# Patient Record
Sex: MALE | Race: WHITE | NOT HISPANIC OR LATINO | Employment: OTHER | URBAN - METROPOLITAN AREA
[De-identification: names, ages, dates, MRNs, and addresses within clinical notes are randomized per-mention and may not be internally consistent; named-entity substitution may affect disease eponyms.]

---

## 2019-09-24 ENCOUNTER — HOSPITAL ENCOUNTER (EMERGENCY)
Facility: HOSPITAL | Age: 34
Discharge: HOME | End: 2019-09-24
Attending: EMERGENCY MEDICINE

## 2019-09-24 VITALS
HEART RATE: 100 BPM | TEMPERATURE: 98 F | DIASTOLIC BLOOD PRESSURE: 92 MMHG | HEIGHT: 71 IN | SYSTOLIC BLOOD PRESSURE: 168 MMHG | RESPIRATION RATE: 20 BRPM | WEIGHT: 200 LBS | OXYGEN SATURATION: 98 % | BODY MASS INDEX: 28 KG/M2

## 2019-09-24 DIAGNOSIS — N50.819 TESTICLE PAIN: Primary | ICD-10-CM

## 2019-09-24 DIAGNOSIS — N44.00 TORSION OF TESTIS: ICD-10-CM

## 2019-09-24 LAB
ALBUMIN SERPL BCP-MCNC: 5.5 G/DL (ref 3.5–5.2)
ALP SERPL-CCNC: 76 U/L (ref 55–135)
ALT SERPL W/O P-5'-P-CCNC: 15 U/L (ref 10–44)
ANION GAP SERPL CALC-SCNC: 10 MMOL/L (ref 8–16)
AST SERPL-CCNC: 16 U/L (ref 10–40)
BASOPHILS # BLD AUTO: 0.01 K/UL (ref 0–0.2)
BASOPHILS NFR BLD: 0.1 % (ref 0–1.9)
BILIRUB SERPL-MCNC: 1.4 MG/DL (ref 0.1–1)
BILIRUB UR QL STRIP: NEGATIVE
BUN SERPL-MCNC: 19 MG/DL (ref 6–20)
CALCIUM SERPL-MCNC: 10.5 MG/DL (ref 8.7–10.5)
CHLORIDE SERPL-SCNC: 100 MMOL/L (ref 95–110)
CLARITY UR: CLEAR
CO2 SERPL-SCNC: 25 MMOL/L (ref 23–29)
COLOR UR: YELLOW
CREAT SERPL-MCNC: 0.9 MG/DL (ref 0.5–1.4)
DIFFERENTIAL METHOD: ABNORMAL
EOSINOPHIL # BLD AUTO: 0 K/UL (ref 0–0.5)
EOSINOPHIL NFR BLD: 0.1 % (ref 0–8)
ERYTHROCYTE [DISTWIDTH] IN BLOOD BY AUTOMATED COUNT: 12.3 % (ref 11.5–14.5)
EST. GFR  (AFRICAN AMERICAN): >60 ML/MIN/1.73 M^2
EST. GFR  (NON AFRICAN AMERICAN): >60 ML/MIN/1.73 M^2
GLUCOSE SERPL-MCNC: 109 MG/DL (ref 70–110)
GLUCOSE UR QL STRIP: NEGATIVE
HCT VFR BLD AUTO: 45.9 % (ref 40–54)
HGB BLD-MCNC: 16.3 G/DL (ref 14–18)
HGB UR QL STRIP: NEGATIVE
KETONES UR QL STRIP: ABNORMAL
LEUKOCYTE ESTERASE UR QL STRIP: NEGATIVE
LYMPHOCYTES # BLD AUTO: 1 K/UL (ref 1–4.8)
LYMPHOCYTES NFR BLD: 11 % (ref 18–48)
MCH RBC QN AUTO: 31.3 PG (ref 27–31)
MCHC RBC AUTO-ENTMCNC: 35.5 G/DL (ref 32–36)
MCV RBC AUTO: 88 FL (ref 82–98)
MONOCYTES # BLD AUTO: 0.6 K/UL (ref 0.3–1)
MONOCYTES NFR BLD: 6.5 % (ref 4–15)
NEUTROPHILS # BLD AUTO: 7.4 K/UL (ref 1.8–7.7)
NEUTROPHILS NFR BLD: 82.3 % (ref 38–73)
NITRITE UR QL STRIP: NEGATIVE
PH UR STRIP: 6 [PH] (ref 5–8)
PLATELET # BLD AUTO: 229 K/UL (ref 150–350)
PMV BLD AUTO: 9.3 FL (ref 9.2–12.9)
POTASSIUM SERPL-SCNC: 4 MMOL/L (ref 3.5–5.1)
PROT SERPL-MCNC: 8.8 G/DL (ref 6–8.4)
PROT UR QL STRIP: NEGATIVE
RBC # BLD AUTO: 5.2 M/UL (ref 4.6–6.2)
SODIUM SERPL-SCNC: 135 MMOL/L (ref 136–145)
SP GR UR STRIP: <=1.005 (ref 1–1.03)
URN SPEC COLLECT METH UR: ABNORMAL
UROBILINOGEN UR STRIP-ACNC: NEGATIVE EU/DL
WBC # BLD AUTO: 9.08 K/UL (ref 3.9–12.7)

## 2019-09-24 PROCEDURE — 81003 URINALYSIS AUTO W/O SCOPE: CPT

## 2019-09-24 PROCEDURE — 80053 COMPREHEN METABOLIC PANEL: CPT

## 2019-09-24 PROCEDURE — 99284 EMERGENCY DEPT VISIT MOD MDM: CPT | Mod: 25

## 2019-09-24 PROCEDURE — 85025 COMPLETE CBC W/AUTO DIFF WBC: CPT

## 2019-09-24 NOTE — ED NOTES
"Pt lying in bed resting and states he is not in any pain.  Pt states, "the ultrasound looked good they said, so I guess I am just the crazy drug addict elsa.  Sorry I came here and bothered you for nothing".  Pt also mentioned that he hasn't slept in 3 days.   "

## 2019-09-24 NOTE — ED NOTES
Patient identifiers for Shaquille Pinto II checked and correct.  LOC: The patient is awake, alert and aware of environment, the patient is oriented x 3 and speaking appropriately.  APPEARANCE: Patient resting comfortably and in no acute distress.  SKIN: The skin is warm and dry, patient has normal skin turgor and moist mucus membranes, skin intact, no breakdown or brusing noted.  MUSKULOSKELETAL: Patient moving all extremities well, no obvious swelling or deformities noted.  RESPIRATORY: Airway is open and patent, respirations are spontaneous, patient has a normal effort and rate.

## 2019-09-24 NOTE — ED NOTES
Complaining of testicle pain.  States it feels like testicle is twisted. States it started around 2 to 3 hours ago.

## 2019-09-25 NOTE — ED PROVIDER NOTES
Encounter Date: 9/24/2019       History     Chief Complaint   Patient presents with    Testicle Pain     pt reports testicle pain that started 3 hours ago     This is a 34-year-old male who denies any medical history, who endorses methamphetamine abuse, presents the ER for evaluation of testicular pain.  Onset approximately 3 hr ago.  Patient reported he had consumed methamphetamines and was masturbating, and then experienced a sharp intense pain in his left testicle.  Reported similar experience happened approximately 3 years ago, resolved on its own.  No fever,, chest pain, shortness of breath, nausea.  He reportedly was concern for urgent came to the for further evaluation.        Review of patient's allergies indicates:  No Known Allergies  No past medical history on file.  No past surgical history on file.  No family history on file.  Social History     Tobacco Use    Smoking status: Not on file   Substance Use Topics    Alcohol use: Not on file    Drug use: Not on file     Review of Systems   Constitutional: Negative for diaphoresis, fatigue and fever.   Respiratory: Negative for chest tightness.    Cardiovascular: Negative for chest pain.   Gastrointestinal: Negative for abdominal distention and abdominal pain.   Genitourinary: Positive for testicular pain. Negative for difficulty urinating, dysuria and genital sores.   Skin: Negative for color change and pallor.   Psychiatric/Behavioral: Negative for agitation and behavioral problems.   All other systems reviewed and are negative.      Physical Exam     Initial Vitals [09/24/19 0520]   BP Pulse Resp Temp SpO2   (!) 176/104 (!) 115 20 97.6 °F (36.4 °C) 100 %      MAP       --         Physical Exam    Nursing note and vitals reviewed.  Constitutional: He appears well-developed and well-nourished.   HENT:   Head: Normocephalic and atraumatic.   Eyes: Pupils are equal, round, and reactive to light.   Cardiovascular: Normal rate and regular rhythm.    Pulmonary/Chest: Breath sounds normal. No respiratory distress.   Abdominal: Soft. Bowel sounds are normal. He exhibits no distension. There is no tenderness.   Genitourinary: Penis normal. No discharge found.   Genitourinary Comments: Grossly normal external genitalia, no lesions, no sign of testicular torsion, no high-riding testicle, no scrotal discoloration, no pain with palpation   Musculoskeletal: Normal range of motion.   Neurological: He is alert and oriented to person, place, and time. GCS score is 15. GCS eye subscore is 4. GCS verbal subscore is 5. GCS motor subscore is 6.   Skin: Skin is warm and dry.   Psychiatric: He has a normal mood and affect. Thought content normal.         ED Course   Procedures  Labs Reviewed   CBC W/ AUTO DIFFERENTIAL - Abnormal; Notable for the following components:       Result Value    Mean Corpuscular Hemoglobin 31.3 (*)     Gran% 82.3 (*)     Lymph% 11.0 (*)     All other components within normal limits   COMPREHENSIVE METABOLIC PANEL - Abnormal; Notable for the following components:    Sodium 135 (*)     Total Protein 8.8 (*)     Albumin 5.5 (*)     Total Bilirubin 1.4 (*)     All other components within normal limits   URINALYSIS, REFLEX TO URINE CULTURE - Abnormal; Notable for the following components:    Specific Gravity, UA <=1.005 (*)     Ketones, UA Trace (*)     All other components within normal limits    Narrative:     Preferred Collection Type->Urine, Clean Catch          Imaging Results          US Scrotum And Testicles (Final result)  Result time 09/24/19 07:11:09    Final result by Kenneth Martin MD (09/24/19 07:11:09)                 Impression:      No significant sonographic abnormality.      Electronically signed by: Kenneth Martin MD  Date:    09/24/2019  Time:    07:11             Narrative:    EXAMINATION:  US SCROTUM AND TESTICLES    CLINICAL HISTORY:  Torsion of testis, unspecified    TECHNIQUE:  Sonography of the scrotum and  testes.    COMPARISON:  None.    FINDINGS:  Right Testicle:    *Size: 4.5 x 2.4 x 2.9 cm  *Appearance: Normal.  *Flow: Normal arterial and venous flow  *Epididymis: Normal.  *Hydrocele: None.  *Varicocele: None.  .    Left Testicle:    *Size: 4.5 x 2.5 x 2.9 cm  *Appearance: Normal.  *Flow: Normal arterial and venous flow  *Epididymis: Normal.  *Hydrocele: None.  *Varicocele: None.  .    Other findings: A few punctate microcalcifications noted within each testicle.                                 Medical Decision Making:   Initial Assessment:   This is a 34-year-old male presents the ER for evaluation of testicular pain onset 3 hr ago, no since resolved.  Reports methamphetamine abuse.  Reports consumed methamphetamine, and then was masturbating when he had the pain. No other complaints.  Testicular exam grossly unremarkable, currently pain-free.  Differential includes UTI, epididymitis, testicular torsion, versus other cause.  Will obtain blood work urine, ultrasound will reassess.                   ED Course as of Sep 24 2023   Tue Sep 24, 2019   0600 Patient signed outTo  who will follow up on results and decide on disposition.    [SE]      ED Course User Index  [SE] Swati Harp MD     Clinical Impression:       ICD-10-CM ICD-9-CM   1. Testicle pain N50.819 608.9   2. Torsion of testis N44.00 608.20         Disposition:   Disposition: Discharged  Condition: Stable                        Swati Harp MD  09/24/19 2023

## 2020-08-07 ENCOUNTER — HOSPITAL ENCOUNTER (EMERGENCY)
Facility: HOSPITAL | Age: 35
Discharge: HOME | End: 2020-08-07
Attending: EMERGENCY MEDICINE
Payer: MEDICAID

## 2020-08-07 VITALS
DIASTOLIC BLOOD PRESSURE: 63 MMHG | WEIGHT: 195 LBS | HEIGHT: 71 IN | BODY MASS INDEX: 27.3 KG/M2 | RESPIRATION RATE: 18 BRPM | SYSTOLIC BLOOD PRESSURE: 126 MMHG | HEART RATE: 86 BPM | OXYGEN SATURATION: 99 % | TEMPERATURE: 100 F

## 2020-08-07 DIAGNOSIS — R50.9 FEVER, UNSPECIFIED FEVER CAUSE: Primary | ICD-10-CM

## 2020-08-07 DIAGNOSIS — R51.9 FRONTAL HEADACHE: ICD-10-CM

## 2020-08-07 PROCEDURE — 99283 EMERGENCY DEPT VISIT LOW MDM: CPT

## 2020-08-07 PROCEDURE — U0003 INFECTIOUS AGENT DETECTION BY NUCLEIC ACID (DNA OR RNA); SEVERE ACUTE RESPIRATORY SYNDROME CORONAVIRUS 2 (SARS-COV-2) (CORONAVIRUS DISEASE [COVID-19]), AMPLIFIED PROBE TECHNIQUE, MAKING USE OF HIGH THROUGHPUT TECHNOLOGIES AS DESCRIBED BY CMS-2020-01-R: HCPCS

## 2020-08-07 PROCEDURE — 25000003 PHARM REV CODE 250: Performed by: NURSE PRACTITIONER

## 2020-08-07 RX ORDER — ACETAMINOPHEN 500 MG
1000 TABLET ORAL
Status: COMPLETED | OUTPATIENT
Start: 2020-08-07 | End: 2020-08-07

## 2020-08-07 RX ORDER — ONDANSETRON 4 MG/1
4 TABLET, ORALLY DISINTEGRATING ORAL EVERY 8 HOURS PRN
Qty: 9 TABLET | Refills: 0 | Status: SHIPPED | OUTPATIENT
Start: 2020-08-07 | End: 2020-08-10

## 2020-08-07 RX ADMIN — ACETAMINOPHEN 1000 MG: 500 TABLET, FILM COATED ORAL at 06:08

## 2020-08-07 NOTE — FIRST PROVIDER EVALUATION
Emergency Department TeleTRIAGE Encounter Note      CHIEF COMPLAINT    Chief Complaint   Patient presents with    COVID-19 Concerns     c/o headache and subjective fever since last night.        VITAL SIGNS   Initial Vitals [08/07/20 1836]   BP Pulse Resp Temp SpO2   133/77 93 16 (!) 100.5 °F (38.1 °C) 99 %      MAP       --            ALLERGIES    Review of patient's allergies indicates:  No Known Allergies    PROVIDER TRIAGE NOTE  This is a teletriage evaluation of a 35 y.o. male presenting to the ED with c/o body aches, headache, and subjective fever.  Patient febrile 100.5.  Tylenol this morning. Initial orders will be placed and care will be transferred to an alternate provider when patient is roomed for a full evaluation. Any additional orders and the final disposition will be determined by that provider.         ORDERS  Labs Reviewed - No data to display    ED Orders (720h ago, onward)    Start Ordered     Status Ordering Provider    08/07/20 1845 08/07/20 1841  acetaminophen tablet 1,000 mg  ED 1 Time      Ordered PARMJIT RUIZ    08/07/20 1842 08/07/20 1841  COVID-19 Routine Screening  STAT      Ordered PARMJIT RUIZ            Virtual Visit Note: The provider triage portion of this emergency department evaluation and documentation was performed via [employer], a HIPAA-compliant telemedicine application, in concert with a tele-presenter in the room. A face to face patient evaluation with one of my colleagues will occur once the patient is placed in an emergency department room.      DISCLAIMER: This note was prepared with [employer] voice recognition transcription software. Garbled syntax, mangled pronouns, and other bizarre constructions may be attributed to that software system.

## 2020-08-07 NOTE — ED NOTES
Pt c/o headache, body aches, and subjective fever since last night. Last took tylenol at about 0630 this morning. Denies chest pain or SOB. Respirations unlabored. Unsure if he has been in close contact with anyone with covid-19.    APPEARANCE: Alert, oriented and in no acute distress.  HEENT: Speaks without hoarseness.  CARDIAC: Normal rate and rhythm.    PERIPHERAL VASCULAR: peripheral pulses present. Normal cap refill. No edema. Warm to touch.    RESPIRATORY:Normal rate and effort. Respirations are equal and unlabored no obvious signs of distress.  GASTRO: soft, nondistended, nontender. Denies nausea, vomiting, or diarrhea.  : voids spontaneously and without difficulty.   MUSC: Full ROM. No obvious deformity. Ambulatory with a steady gait  SKIN: Skin is warm and dry, without discoloration. Mucous membranes moist.  NEURO: Pt is awake, alert, aware of environment. No neurologic deficits noted.

## 2020-08-08 LAB — SARS-COV-2 RNA RESP QL NAA+PROBE: DETECTED

## 2020-08-08 NOTE — ED PROVIDER NOTES
Encounter Date: 8/7/2020       History     Chief Complaint   Patient presents with    COVID-19 Concerns     c/o headache and subjective fever since last night.          Patient is a 35-year-old white male who presents ED with complaint of headache and subjective fever x1 day.  Patient reports bitemporal headache intermittent in nature described as throbbing without radiation.  He denies any nausea, vomiting, chest pain or shortness of breath. Furthermore, he deneis any body aches, ageusia, anosmia, sick contacts, but states that he does go to the clinic twice a week for drug testing.  Patient does report subjective fever and he does have a temperature of 100.5° F on arrival to the ED today.  Patient has not taken anything for the aforementioned symptoms in question.          Review of patient's allergies indicates:  No Known Allergies  History reviewed. No pertinent past medical history.  History reviewed. No pertinent surgical history.  No family history on file.  Social History     Tobacco Use    Smoking status: Former Smoker   Substance Use Topics    Alcohol use: Not Currently    Drug use: Not on file     Review of Systems   Constitutional: Positive for fever. Negative for chills and fatigue.   HENT: Negative for sinus pressure, sinus pain, sore throat and trouble swallowing.    Eyes: Negative for photophobia and visual disturbance.   Respiratory: Negative for chest tightness and shortness of breath.    Gastrointestinal: Negative for abdominal pain, nausea and vomiting.   Genitourinary: Negative for dysuria and frequency.   Musculoskeletal: Negative for back pain, myalgias, neck pain and neck stiffness.   Allergic/Immunologic: Negative for immunocompromised state.   Neurological: Positive for headaches. Negative for dizziness, tremors, syncope and numbness.   Psychiatric/Behavioral: Negative for agitation and confusion.       Physical Exam     Initial Vitals [08/07/20 1836]   BP Pulse Resp Temp SpO2    133/77 93 16 (!) 100.5 °F (38.1 °C) 99 %      MAP       --         Physical Exam    Nursing note and vitals reviewed.  Constitutional: He appears well-developed and well-nourished.   Well-appearing, no acute distress; nontoxic in appearance     HENT:   Head: Normocephalic and atraumatic.   Neck: Normal range of motion.   Psychiatric: He has a normal mood and affect.         ED Course   Procedures  Labs Reviewed   SARS-COV-2 (COVID-19) QUALITATIVE PCR          Imaging Results    None          Medical Decision Making:   ED Management:  -  Concern for possible COVID 19 infection; routine COVID swab obtained; patient administered 1 g of Tylenol for fever with improvement; patient instructed to go home stay well hydrated, alternate between ibuprofen and Tylenol for fever:  Full the patient prescription for Zofran p.r.n. nausea; patient will be contacted regarding COVID test results; patient stable for discharge at this time  - No further intervention is indicated at this time after having taken into account the patient's history, physical exam findings, and empirical and objective data obtained during the patient's emergency department workup.   - The patient is at low risk for an emergent medical condition at this time, and I am of the belief that that it is safe to discharge the patient from the emergency department.   - The patient is instructed to follow up as outpatient as indicated on the discharge paperwork.    - I have discussed the specifics of the workup with the patient and the patient has verbalized understanding of the details of the workup, the diagnosis, the treatment plan, and the need for outpatient follow-up.    - Although the patient has no emergent etiology today this does not preclude the development of an emergent condition so, in addition, I have advised the patient that they can return to the ED and/or activate EMS at any time with worsening of their symptoms, change of their symptoms, or with  any other medical complaint.    - The patient remained comfortable and stable during their visit in the ED.    - Discharge and follow-up instructions discussed with the patient who expressed understanding and willingness to comply with my recommendations.  - Results of all emergency department tests  discussed thoroughly with patient; all patient questions answered; pt in agreement with plan  - Pt instructed to follow up with PCP in 2-3 days for recheck of today's complaints  - Pt given strict emergency department return precautions for any new or worsening of symptoms  - Pt discharged from the emergency department in stable condition, in no acute distress                                   Clinical Impression:       ICD-10-CM ICD-9-CM   1. Fever, unspecified fever cause  R50.9 780.60   2. Frontal headache  R51 784.0                                Raul Kowalski MD  08/07/20 5304       Raul Kowalski MD  08/07/20 3367

## 2020-08-09 DIAGNOSIS — U07.1 COVID-19 VIRUS DETECTED: ICD-10-CM

## 2021-03-24 ENCOUNTER — PATIENT MESSAGE (OUTPATIENT)
Dept: UROLOGY | Facility: CLINIC | Age: 36
End: 2021-03-24

## 2021-05-18 ENCOUNTER — OFFICE VISIT (OUTPATIENT)
Dept: UROLOGY | Facility: CLINIC | Age: 36
End: 2021-05-18
Payer: MEDICAID

## 2021-05-18 VITALS
HEART RATE: 67 BPM | WEIGHT: 200 LBS | HEIGHT: 71 IN | BODY MASS INDEX: 28 KG/M2 | SYSTOLIC BLOOD PRESSURE: 110 MMHG | RESPIRATION RATE: 18 BRPM | OXYGEN SATURATION: 99 % | DIASTOLIC BLOOD PRESSURE: 72 MMHG

## 2021-05-18 DIAGNOSIS — R53.82 CHRONIC FATIGUE: ICD-10-CM

## 2021-05-18 DIAGNOSIS — R79.89 LOW TESTOSTERONE IN MALE: ICD-10-CM

## 2021-05-18 DIAGNOSIS — Z79.890 ENCOUNTER FOR MONITORING TESTOSTERONE REPLACEMENT THERAPY: Primary | ICD-10-CM

## 2021-05-18 DIAGNOSIS — N52.8 OTHER MALE ERECTILE DYSFUNCTION: ICD-10-CM

## 2021-05-18 DIAGNOSIS — Z51.81 ENCOUNTER FOR MONITORING TESTOSTERONE REPLACEMENT THERAPY: Primary | ICD-10-CM

## 2021-05-18 PROCEDURE — 99204 OFFICE O/P NEW MOD 45 MIN: CPT | Mod: S$PBB,,, | Performed by: UROLOGY

## 2021-05-18 PROCEDURE — 99213 OFFICE O/P EST LOW 20 MIN: CPT | Mod: PBBFAC,PO | Performed by: UROLOGY

## 2021-05-18 PROCEDURE — 99204 PR OFFICE/OUTPT VISIT, NEW, LEVL IV, 45-59 MIN: ICD-10-PCS | Mod: S$PBB,,, | Performed by: UROLOGY

## 2021-05-18 PROCEDURE — 99999 PR PBB SHADOW E&M-EST. PATIENT-LVL III: CPT | Mod: PBBFAC,,, | Performed by: UROLOGY

## 2021-05-18 PROCEDURE — 99999 PR PBB SHADOW E&M-EST. PATIENT-LVL III: ICD-10-PCS | Mod: PBBFAC,,, | Performed by: UROLOGY

## 2021-05-18 RX ORDER — TESTOSTERONE CYPIONATE 200 MG/ML
200 INJECTION, SOLUTION INTRAMUSCULAR
Qty: 10 ML | Refills: 1 | Status: SHIPPED | OUTPATIENT
Start: 2021-05-18 | End: 2021-11-16

## 2021-05-23 RX ORDER — ANASTROZOLE 1 MG/1
1 TABLET ORAL DAILY
Qty: 30 TABLET | Refills: 11 | Status: SHIPPED | OUTPATIENT
Start: 2021-05-23 | End: 2022-05-23

## 2021-05-24 ENCOUNTER — TELEPHONE (OUTPATIENT)
Dept: UROLOGY | Facility: CLINIC | Age: 36
End: 2021-05-24

## 2021-05-26 ENCOUNTER — TELEPHONE (OUTPATIENT)
Dept: UROLOGY | Facility: CLINIC | Age: 36
End: 2021-05-26

## 2021-06-01 ENCOUNTER — PATIENT MESSAGE (OUTPATIENT)
Dept: UROLOGY | Facility: CLINIC | Age: 36
End: 2021-06-01

## 2021-11-08 ENCOUNTER — TELEPHONE (OUTPATIENT)
Dept: UROLOGY | Facility: CLINIC | Age: 36
End: 2021-11-08
Payer: MEDICAID

## 2021-11-09 ENCOUNTER — TELEPHONE (OUTPATIENT)
Dept: UROLOGY | Facility: CLINIC | Age: 36
End: 2021-11-09
Payer: MEDICAID

## 2022-02-03 ENCOUNTER — HOSPITAL ENCOUNTER (EMERGENCY)
Facility: HOSPITAL | Age: 37
Discharge: HOME | End: 2022-02-03
Attending: EMERGENCY MEDICINE
Payer: MEDICAID

## 2022-02-03 VITALS
HEART RATE: 77 BPM | OXYGEN SATURATION: 100 % | TEMPERATURE: 99 F | DIASTOLIC BLOOD PRESSURE: 66 MMHG | RESPIRATION RATE: 20 BRPM | SYSTOLIC BLOOD PRESSURE: 127 MMHG

## 2022-02-03 DIAGNOSIS — S61.213A LACERATION OF LEFT MIDDLE FINGER WITHOUT FOREIGN BODY WITHOUT DAMAGE TO NAIL, INITIAL ENCOUNTER: Primary | ICD-10-CM

## 2022-02-03 PROCEDURE — 12002 RPR S/N/AX/GEN/TRNK2.6-7.5CM: CPT

## 2022-02-03 PROCEDURE — 90715 TDAP VACCINE 7 YRS/> IM: CPT | Performed by: EMERGENCY MEDICINE

## 2022-02-03 PROCEDURE — 90471 IMMUNIZATION ADMIN: CPT | Performed by: EMERGENCY MEDICINE

## 2022-02-03 PROCEDURE — 25000003 PHARM REV CODE 250: Performed by: EMERGENCY MEDICINE

## 2022-02-03 PROCEDURE — 63600175 PHARM REV CODE 636 W HCPCS: Performed by: EMERGENCY MEDICINE

## 2022-02-03 PROCEDURE — 99284 EMERGENCY DEPT VISIT MOD MDM: CPT | Mod: 25

## 2022-02-03 RX ORDER — LIDOCAINE HYDROCHLORIDE 10 MG/ML
INJECTION INFILTRATION; PERINEURAL
Status: COMPLETED
Start: 2022-02-03 | End: 2022-02-03

## 2022-02-03 RX ORDER — CEPHALEXIN 500 MG/1
500 CAPSULE ORAL 4 TIMES DAILY
Qty: 20 CAPSULE | Refills: 0 | Status: SHIPPED | OUTPATIENT
Start: 2022-02-03 | End: 2022-02-08

## 2022-02-03 RX ORDER — HYDROCODONE BITARTRATE AND ACETAMINOPHEN 5; 325 MG/1; MG/1
1 TABLET ORAL EVERY 4 HOURS PRN
Qty: 10 TABLET | Refills: 0 | Status: SHIPPED | OUTPATIENT
Start: 2022-02-03

## 2022-02-03 RX ORDER — LIDOCAINE HYDROCHLORIDE 10 MG/ML
5 INJECTION, SOLUTION EPIDURAL; INFILTRATION; INTRACAUDAL; PERINEURAL
Status: COMPLETED | OUTPATIENT
Start: 2022-02-03 | End: 2022-02-03

## 2022-02-03 RX ORDER — IBUPROFEN 800 MG/1
800 TABLET ORAL EVERY 6 HOURS PRN
Qty: 30 TABLET | Refills: 0 | Status: SHIPPED | OUTPATIENT
Start: 2022-02-03

## 2022-02-03 RX ORDER — IBUPROFEN 400 MG/1
800 TABLET ORAL
Status: COMPLETED | OUTPATIENT
Start: 2022-02-03 | End: 2022-02-03

## 2022-02-03 RX ADMIN — LIDOCAINE HYDROCHLORIDE 50 MG: 10 INJECTION, SOLUTION EPIDURAL; INFILTRATION; INTRACAUDAL; PERINEURAL at 06:02

## 2022-02-03 RX ADMIN — TETANUS TOXOID, REDUCED DIPHTHERIA TOXOID AND ACELLULAR PERTUSSIS VACCINE, ADSORBED 0.5 ML: 5; 2.5; 8; 8; 2.5 SUSPENSION INTRAMUSCULAR at 07:02

## 2022-02-03 RX ADMIN — IBUPROFEN 800 MG: 400 TABLET ORAL at 06:02

## 2022-02-03 RX ADMIN — LIDOCAINE HYDROCHLORIDE 50 MG: 10 INJECTION, SOLUTION EPIDURAL; INFILTRATION; INTRACAUDAL at 06:02

## 2022-02-03 NOTE — ED PROVIDER NOTES
"Encounter Date: 2/3/2022    SCRIBE #1 NOTE: I, Elli Aguayo and marychuy scribing for, and in the presence of, Lupillo Hogan MD.       History     Chief Complaint   Patient presents with    Laceration     Left middle finger laceration while working with router tool, slight bleeding noted, pressure dressing applied in triage      Shaquille Carpenter II is a 36 y.o. male who  has no past medical history on file.    The patient presents to the ED due to a laceration.   Patient presents with a pressure dressing applied in triage and has slight active bleeding. Patient reports his left middle finger slipped while he was working with a router tool prior to arrival. He notes his finger is cut on one side and is "chomped" on the other side. He reports he does not know the date of his last tetanus immunization.      The history is provided by the patient.     Review of patient's allergies indicates:  No Known Allergies  No past medical history on file.  No past surgical history on file.  No family history on file.  Social History     Tobacco Use    Smoking status: Current Some Day Smoker   Substance Use Topics    Alcohol use: Not Currently    Drug use: Not Currently     Review of Systems   Constitutional: Negative for fever.   HENT: Negative for sore throat.    Respiratory: Negative for shortness of breath.    Cardiovascular: Negative for chest pain.   Gastrointestinal: Negative for nausea.   Genitourinary: Negative for dysuria.   Musculoskeletal: Negative for back pain.   Skin: Positive for wound. Negative for rash.   Neurological: Negative for weakness.   Hematological: Does not bruise/bleed easily.       Physical Exam     Initial Vitals [02/03/22 1537]   BP Pulse Resp Temp SpO2   127/66 77 20 99 °F (37.2 °C) 100 %      MAP       --         Physical Exam    Nursing note and vitals reviewed.  Constitutional: He appears well-developed and well-nourished. He is not diaphoretic. No distress.   HENT:   Head: " Normocephalic and atraumatic.   Mouth/Throat: Oropharynx is clear and moist.   Eyes: EOM are normal. Pupils are equal, round, and reactive to light.   Neck: No tracheal deviation present.   Cardiovascular: Normal rate, regular rhythm, normal heart sounds and intact distal pulses.   Pulmonary/Chest: Breath sounds normal. No stridor. No respiratory distress.   Abdominal: Abdomen is soft. He exhibits no distension and no mass. There is no abdominal tenderness.   Musculoskeletal:         General: No edema. Normal range of motion.     Neurological: He is alert and oriented to person, place, and time. No cranial nerve deficit or sensory deficit.   Skin: Skin is warm and dry. Capillary refill takes less than 2 seconds. No rash noted.   3cm Emaciated circumferential laceration to the distal aspect of the left middle finger.   Psychiatric: He has a normal mood and affect. His behavior is normal. Thought content normal.         ED Course   Lac Repair    Date/Time: 2/3/2022 7:09 PM  Performed by: Lupillo Hogan MD  Authorized by: Lupillo Hogan MD     Consent:     Consent obtained:  Verbal    Consent given by:  Patient    Risks, benefits, and alternatives were discussed: yes      Risks discussed:  Need for additional repair, poor cosmetic result, pain and infection    Alternatives discussed:  No treatment  Universal protocol:     Procedure explained and questions answered to patient or proxy's satisfaction: yes      Imaging studies available: yes      Patient identity confirmed:  Verbally with patient and arm band  Anesthesia:     Anesthesia method:  Nerve block    Block anesthetic:  Lidocaine 1% w/o epi    Block outcome:  Anesthesia achieved  Laceration details:     Location:  Finger    Finger location:  L long finger    Length (cm):  3  Exploration:     Imaging outcome: foreign body not noted      Wound extent: underlying fracture    Treatment:     Amount of cleaning:  Standard    Irrigation solution:  Sterile saline     Irrigation method:  Syringe    Visualized foreign bodies/material removed: no      Debridement:  None    Undermining:  None    Scar revision: no    Skin repair:     Repair method:  Sutures    Suture size:  5-0    Suture material:  Nylon    Suture technique:  Simple interrupted    Number of sutures:  7  Approximation:     Approximation:  Loose  Repair type:     Repair type:  Simple  Post-procedure details:     Dressing:  Bulky dressing    Procedure completion:  Tolerated well, no immediate complications      Labs Reviewed - No data to display       Imaging Results          X-Ray Finger 2 or More Views Left (Final result)  Result time 02/03/22 17:41:45    Final result by Russel Love MD (02/03/22 17:41:45)                 Impression:      As above      Electronically signed by: Russel Love  Date:    02/03/2022  Time:    17:41             Narrative:    EXAMINATION:  XR FINGER 2 OR MORE VIEWS LEFT    CLINICAL HISTORY:  finger injury;    TECHNIQUE:  Three views of the left middle finger    COMPARISON:  None    FINDINGS:  There is fracture of the medial aspect of the distal phalanx proximal base with fragments of the bone seen in the underlying soft tissues along its medial border at the PIP.  No additional fracture or definite foreign body is evident.  Mild soft tissue swelling is present.                                 Medications   LIDOcaine (PF) 10 mg/ml (1%) injection 50 mg (50 mg Infiltration Given by Other 2/3/22 1810)   ibuprofen tablet 800 mg (800 mg Oral Given 2/3/22 1820)   Tdap (BOOSTRIX) vaccine injection 0.5 mL (0.5 mLs Intramuscular Given 2/3/22 1913)   LIDOcaine HCL 10 mg/ml (1%) 10 mg/mL (1 %) injection (  Override Pull 2/3/22 1840)   LIDOcaine (PF) 10 mg/ml (1%) injection 50 mg (50 mg Infiltration Given by Other 2/3/22 1840)     Medical Decision Making:   Clinical Tests:   Radiological Study: Ordered and Reviewed          Scribe Attestation:   Scribe #1: I performed the above scribed service  and the documentation accurately describes the services I performed. I attest to the accuracy of the note.    Attending Attestation:           Physician Attestation for Scribe:  Physician Attestation Statement for Scribe #1: I, hang collazo, reviewed documentation, as scribed by priya lau in my presence, and it is both accurate and complete.         Attending ED Notes:   Laceration repaired without difficulty. Pt advised to f/u w hand. Will dc w pain control and abx               Clinical Impression:   Final diagnoses:  [S61.611A] Laceration of left middle finger without foreign body without damage to nail, initial encounter (Primary)          ED Disposition Condition    Discharge Stable        ED Prescriptions     Medication Sig Dispense Start Date End Date Auth. Provider    ibuprofen (ADVIL,MOTRIN) 800 MG tablet Take 1 tablet (800 mg total) by mouth every 6 (six) hours as needed for Pain. 30 tablet 2/3/2022  Hang Collazo MD    HYDROcodone-acetaminophen (NORCO) 5-325 mg per tablet Take 1 tablet by mouth every 4 (four) hours as needed for Pain. 10 tablet 2/3/2022  Hang Collazo MD    cephALEXin (KEFLEX) 500 MG capsule () Take 1 capsule (500 mg total) by mouth 4 (four) times daily. for 5 days 20 capsule 2/3/2022 2022 Hang Collazo MD        Follow-up Information    None        Scribe Attestation     Hang Collazo MD  22       Hang Collazo MD  22

## 2024-09-09 ENCOUNTER — HOSPITAL ENCOUNTER (EMERGENCY)
Facility: HOSPITAL | Age: 39
Discharge: HOME | End: 2024-09-09
Attending: STUDENT IN AN ORGANIZED HEALTH CARE EDUCATION/TRAINING PROGRAM
Payer: MEDICAID

## 2024-09-09 VITALS
TEMPERATURE: 98 F | WEIGHT: 205 LBS | HEIGHT: 70 IN | RESPIRATION RATE: 18 BRPM | OXYGEN SATURATION: 96 % | DIASTOLIC BLOOD PRESSURE: 71 MMHG | HEART RATE: 108 BPM | SYSTOLIC BLOOD PRESSURE: 129 MMHG | BODY MASS INDEX: 29.35 KG/M2

## 2024-09-09 DIAGNOSIS — M43.6 TORTICOLLIS: Primary | ICD-10-CM

## 2024-09-09 DIAGNOSIS — M54.2 NECK PAIN: ICD-10-CM

## 2024-09-09 PROCEDURE — 96372 THER/PROPH/DIAG INJ SC/IM: CPT | Performed by: STUDENT IN AN ORGANIZED HEALTH CARE EDUCATION/TRAINING PROGRAM

## 2024-09-09 PROCEDURE — 63600175 PHARM REV CODE 636 W HCPCS: Performed by: STUDENT IN AN ORGANIZED HEALTH CARE EDUCATION/TRAINING PROGRAM

## 2024-09-09 PROCEDURE — 99285 EMERGENCY DEPT VISIT HI MDM: CPT | Mod: 25

## 2024-09-09 PROCEDURE — 25000003 PHARM REV CODE 250: Performed by: STUDENT IN AN ORGANIZED HEALTH CARE EDUCATION/TRAINING PROGRAM

## 2024-09-09 RX ORDER — CYCLOBENZAPRINE HCL 10 MG
10 TABLET ORAL 3 TIMES DAILY PRN
Qty: 15 TABLET | Refills: 0 | Status: SHIPPED | OUTPATIENT
Start: 2024-09-09 | End: 2024-09-14

## 2024-09-09 RX ORDER — OXYCODONE HYDROCHLORIDE 5 MG/1
5 TABLET ORAL
Status: COMPLETED | OUTPATIENT
Start: 2024-09-09 | End: 2024-09-09

## 2024-09-09 RX ORDER — CYCLOBENZAPRINE HCL 10 MG
10 TABLET ORAL
Status: COMPLETED | OUTPATIENT
Start: 2024-09-09 | End: 2024-09-09

## 2024-09-09 RX ORDER — OXYCODONE HYDROCHLORIDE 5 MG/1
5 TABLET ORAL EVERY 12 HOURS PRN
Qty: 6 TABLET | Refills: 0 | Status: SHIPPED | OUTPATIENT
Start: 2024-09-09 | End: 2024-09-12

## 2024-09-09 RX ORDER — DEXAMETHASONE SODIUM PHOSPHATE 4 MG/ML
8 INJECTION, SOLUTION INTRA-ARTICULAR; INTRALESIONAL; INTRAMUSCULAR; INTRAVENOUS; SOFT TISSUE
Status: COMPLETED | OUTPATIENT
Start: 2024-09-09 | End: 2024-09-09

## 2024-09-09 RX ORDER — PREDNISONE 20 MG/1
40 TABLET ORAL DAILY
Qty: 8 TABLET | Refills: 0 | Status: SHIPPED | OUTPATIENT
Start: 2024-09-09 | End: 2024-09-13

## 2024-09-09 RX ORDER — ACETAMINOPHEN 500 MG
1000 TABLET ORAL
Status: COMPLETED | OUTPATIENT
Start: 2024-09-09 | End: 2024-09-09

## 2024-09-09 RX ORDER — NAPROXEN 500 MG/1
500 TABLET ORAL 2 TIMES DAILY
Qty: 10 TABLET | Refills: 0 | Status: SHIPPED | OUTPATIENT
Start: 2024-09-09 | End: 2024-09-14

## 2024-09-09 RX ADMIN — OXYCODONE HYDROCHLORIDE 5 MG: 5 TABLET ORAL at 03:09

## 2024-09-09 RX ADMIN — ACETAMINOPHEN 1000 MG: 500 TABLET ORAL at 03:09

## 2024-09-09 RX ADMIN — CYCLOBENZAPRINE 10 MG: 10 TABLET, FILM COATED ORAL at 03:09

## 2024-09-09 RX ADMIN — DEXAMETHASONE SODIUM PHOSPHATE 8 MG: 4 INJECTION, SOLUTION INTRA-ARTICULAR; INTRALESIONAL; INTRAMUSCULAR; INTRAVENOUS; SOFT TISSUE at 03:09

## 2024-09-09 RX ADMIN — OXYCODONE HYDROCHLORIDE 5 MG: 5 TABLET ORAL at 05:09

## 2024-09-09 NOTE — Clinical Note
"Shaquille Fraga" Jayden was seen and treated in our emergency department on 9/9/2024.  He may return to work on 09/11/2024.       If you have any questions or concerns, please don't hesitate to call.      Oliver Navarro MD"

## 2024-09-09 NOTE — DISCHARGE INSTRUCTIONS
Follow-up with your primary care doctor by calling for appointment.  Use medications as prescribed and return to the emergency department if condition worsens in any way.

## 2024-09-09 NOTE — ED PROVIDER NOTES
Encounter Date: 9/9/2024       History     Chief Complaint   Patient presents with    Neck Pain     Pt reports Saturday night he slept wrong and his neck is hurting and he cannot lift it up. Denies any trauma or any other symptoms.      HPI  39-year-old male who reports no significant medical history presents brought in by self through triage for neck pain.  He states that last night he slept wrong and over the course of the day has had severe neck pain, aching, to the point where he feels it is somewhat stuck in a flexed forward position.  He denies any other systemic or infectious symptoms or other acute complaints  Review of patient's allergies indicates:  No Known Allergies  No past medical history on file.  No past surgical history on file.  No family history on file.  Social History     Tobacco Use    Smoking status: Some Days   Substance Use Topics    Alcohol use: Not Currently    Drug use: Not Currently   Medical surgical family and social history reviewed and considered medical decision-making  Review of Systems  Complete review of systems was conducted and was negative except as per HPI and as marked  Physical Exam     Initial Vitals [09/09/24 0333]   BP Pulse Resp Temp SpO2   129/71 108 16 97.8 °F (36.6 °C) 96 %      MAP       --         Physical Exam  Physical  General: Awake, alert, no acute distress  Head: Normocephalic, atraumatic  Neck: Trachea midline, full range of motion, no meningismus  ENT: Atraumatic, Airway Patent  Cardio: Regular Rate, Regular Rhythm, Heart Sounds Normal, Capillary refill normal  Chest: Atraumatic, No respiratory distress no use of accessory muscles to breath, normal rate, sounds even and clear to auscultation  Abdomen: Soft, Nontender, no involuntary guarding, rigidity, or rebound.  Upper Ext: Atraumatic, Inspection normal, no swelling  Lower Ext: Atraumatic, Inspection normal, no swelling  Neuro: No gross cranial nerve abnormality, no lateralization, no gross sensory or  motor deficits    Holding neck flexed forward, able to range it fully with effort and encouragement.  No radiculopathy elicited with movement  ED Course   Procedures  Labs Reviewed - No data to display       Imaging Results              CT Cervical Spine Without Contrast (Final result)  Result time 09/09/24 04:45:23      Final result by Get Chaudhary MD (09/09/24 04:45:23)                   Impression:      Straightened cervical lordosis.    Mild degenerative changes.    Otherwise no acute CT abnormality in the visualized cervical spine (C7 and the cervicothoracic junction are not fully visualized).     images: Incompletely visualized, but probably enlarged, cardiopericardial silhouette.  Correlate clinically.      Electronically signed by: Get Chaudhary  Date:    09/09/2024  Time:    04:45               Narrative:    EXAMINATION:  CT CERVICAL SPINE WITHOUT CONTRAST    CLINICAL HISTORY:  acute neck pain with torticolus;    TECHNIQUE:  Low dose axial images, sagittal and coronal reformations were performed though the cervical spine.  Contrast was not administered.    COMPARISON:  None    FINDINGS:  Artifacts related to beam hardening and or motion degrade portions of the scan    Straightened cervical lordosis.    The C7 vertebra and the cervicothoracic junction are not fully visualized.    Allowing for the above, the visualized portion of the cervical spine demonstrates no acute fracture deformity and no evidence of traumatic vertebral malalignment.    No abnormal C1-C2 rotation.    Mild degenerative changes in small posterior disc protrusions or posterior disc osteophytes at C4-5 and C6-7.  Visualized cervical facet joints demonstrate no dislocation, subluxation, or advanced arthropathy.    No CT evidence of advanced vertebral osteomyelitis or advanced infectious spondylitis in visualized cervical spine.    Intraspinal soft tissue detail is suboptimally visualized by CT but, allowing for this, no bulky  intraspinal masses are demonstrated in the cervical region.    Sternocleidomastoid muscles are not fully visualized though visualized portions of the left appears slightly larger in cross-section on the right.    No cervical prevertebral soft tissue swelling.    No lymphadenopathy in the visualized portions of the cervical soft tissues.    Normal epiglottis.    Lung apices: Not visualized.     images: Incompletely visualized, but probably enlarged, cardiopericardial silhouette.                                       Medications   dexAMETHasone injection 8 mg (8 mg Intramuscular Given 9/9/24 0354)   acetaminophen tablet 1,000 mg (1,000 mg Oral Given 9/9/24 0353)   cyclobenzaprine tablet 10 mg (10 mg Oral Given 9/9/24 0354)   oxyCODONE immediate release tablet 5 mg (5 mg Oral Given 9/9/24 0353)   oxyCODONE immediate release tablet 5 mg (5 mg Oral Given 9/9/24 0521)     Medical Decision Making  Amount and/or Complexity of Data Reviewed  Radiology: ordered.    Risk  OTC drugs.  Prescription drug management.                39-year-old male presents for acute neck pain and holding it bent forward, by examination history overall consistent with torticollis/muscle spasm, however underlying skeletal EMC is or of course considered and we will be ruled out.  History and examination is not consistent with meningitis, vascular dissection, subarachnoid hemorrhage, or any other pertinent EMC.     All studies reviewed, negative for acute pathology requiring intervention. Diagnosis, use of prescription medications, need for follow-up regarding visit today, need to call for follow-up, expected course, and return precautions were all discussed at length in my traditional manner to which patient verbalized understanding and agrees and all questions were answered. Patient is well appearing and ambulatory at time of discharge.                 Clinical Impression:  Final diagnoses:  [M43.6] Torticollis (Primary)  [M54.2] Neck pain           ED Disposition Condition    Discharge Stable          ED Prescriptions       Medication Sig Dispense Start Date End Date Auth. Provider    naproxen (NAPROSYN) 500 MG tablet Take 1 tablet (500 mg total) by mouth 2 (two) times daily. for 5 days 10 tablet 9/9/2024 9/14/2024 Oliver Navarro MD    cyclobenzaprine (FLEXERIL) 10 MG tablet Take 1 tablet (10 mg total) by mouth 3 (three) times daily as needed for Muscle spasms. 15 tablet 9/9/2024 9/14/2024 Oliver Navarro MD    predniSONE (DELTASONE) 20 MG tablet Take 2 tablets (40 mg total) by mouth once daily. for 4 days 8 tablet 9/9/2024 9/13/2024 Oliver Navarro MD    oxyCODONE (ROXICODONE) 5 MG immediate release tablet Take 1 tablet (5 mg total) by mouth every 12 (twelve) hours as needed. 6 tablet 9/9/2024 9/12/2024 Oliver Navarro MD          Follow-up Information    None          Oliver Navarro MD  09/10/24 0208